# Patient Record
Sex: FEMALE | Race: BLACK OR AFRICAN AMERICAN | NOT HISPANIC OR LATINO | URBAN - METROPOLITAN AREA
[De-identification: names, ages, dates, MRNs, and addresses within clinical notes are randomized per-mention and may not be internally consistent; named-entity substitution may affect disease eponyms.]

---

## 2018-11-09 ENCOUNTER — EMERGENCY (EMERGENCY)
Facility: HOSPITAL | Age: 53
LOS: 1 days | Discharge: HOME | End: 2018-11-09
Attending: EMERGENCY MEDICINE
Payer: COMMERCIAL

## 2018-11-09 VITALS
HEIGHT: 68 IN | WEIGHT: 160.06 LBS | TEMPERATURE: 98 F | SYSTOLIC BLOOD PRESSURE: 159 MMHG | DIASTOLIC BLOOD PRESSURE: 100 MMHG | OXYGEN SATURATION: 96 % | HEART RATE: 82 BPM | RESPIRATION RATE: 18 BRPM

## 2018-11-09 VITALS
SYSTOLIC BLOOD PRESSURE: 139 MMHG | RESPIRATION RATE: 16 BRPM | OXYGEN SATURATION: 100 % | DIASTOLIC BLOOD PRESSURE: 88 MMHG | HEART RATE: 74 BPM | TEMPERATURE: 98 F

## 2018-11-09 PROCEDURE — 82962 GLUCOSE BLOOD TEST: CPT

## 2018-11-09 PROCEDURE — 99284 EMERGENCY DEPT VISIT MOD MDM: CPT

## 2018-11-09 PROCEDURE — 99283 EMERGENCY DEPT VISIT LOW MDM: CPT

## 2018-11-09 NOTE — ED PROVIDER NOTE - MEDICAL DECISION MAKING DETAILS
Pt with hx of seizures, s/p discussed with Dr. Rivera will discharge. Pt having scheduled MRI for this week. Pt with hx of seizures, s/p discussed with Dr. Mcbride will discharge. Pt having scheduled MRI for NEXT week.

## 2018-11-09 NOTE — ED PROVIDER NOTE - OBJECTIVE STATEMENT
52 y/o F pt with hx of seizures, presents to ED s/p seizure episode c/o HA and dizziness. Pt felt dizzy while at work, and was told she had seized after LOC. Pt now in ED states she feels better. Pt is compliant with her seizure meds, pt has not missed any doses. Pt called her neurologist after episode and has a scheduled MRI this Monday. She admits it is normal for her to have some decreased sensation to the R side of her face. Denies head trauma. No further complaints at this time. 52 y/o F pt with hx of seizures, presents to ED s/p seizure episode c/o HA and dizziness. Pt felt dizzy while at work, and was told she had seized after LOC. Pt now in ED states she feels better. Pt is compliant with her seizure meds, pt has not missed any doses. Pt called her neurologist after episode and has a scheduled MRI this Monday. She admits it is normal for her to have some decreased sensation to the R side of her face. Denies head trauma. No further complaints at this time.  Neuro: Nicole

## 2018-11-09 NOTE — ED PROVIDER NOTE - NEUROLOGICAL, MLM
Alert and oriented, no focal deficits, no motor or sensory deficits. Neurovascularly intact. Normal leg raise, No pronator drift.

## 2018-11-09 NOTE — ED ADULT TRIAGE NOTE - CHIEF COMPLAINT QUOTE
Patient BIBA, witnessed seizure lasting about 1 min, hx of seizures and on seizure medication, patient awake, drowsy at this time which is normal for patient after seizure

## 2018-11-09 NOTE — ED ADULT NURSE NOTE - OBJECTIVE STATEMENT
Patient is alert and verbal. c/o dizziness, lightheadedness, drowsy. had a seizure unknown duration.

## 2019-08-06 ENCOUNTER — APPOINTMENT (OUTPATIENT)
Dept: OBGYN | Facility: CLINIC | Age: 54
End: 2019-08-06
Payer: COMMERCIAL

## 2019-08-06 VITALS
SYSTOLIC BLOOD PRESSURE: 130 MMHG | WEIGHT: 177 LBS | HEIGHT: 67 IN | HEART RATE: 72 BPM | BODY MASS INDEX: 27.78 KG/M2 | DIASTOLIC BLOOD PRESSURE: 85 MMHG

## 2019-08-06 DIAGNOSIS — Z82.49 FAMILY HISTORY OF ISCHEMIC HEART DISEASE AND OTHER DISEASES OF THE CIRCULATORY SYSTEM: ICD-10-CM

## 2019-08-06 DIAGNOSIS — Z78.9 OTHER SPECIFIED HEALTH STATUS: ICD-10-CM

## 2019-08-06 DIAGNOSIS — R32 UNSPECIFIED URINARY INCONTINENCE: ICD-10-CM

## 2019-08-06 DIAGNOSIS — R35.0 FREQUENCY OF MICTURITION: ICD-10-CM

## 2019-08-06 DIAGNOSIS — Z80.9 FAMILY HISTORY OF MALIGNANT NEOPLASM, UNSPECIFIED: ICD-10-CM

## 2019-08-06 DIAGNOSIS — Z87.898 PERSONAL HISTORY OF OTHER SPECIFIED CONDITIONS: ICD-10-CM

## 2019-08-06 PROCEDURE — 99204 OFFICE O/P NEW MOD 45 MIN: CPT

## 2019-08-07 LAB
C TRACH RRNA SPEC QL NAA+PROBE: NOT DETECTED
HPV HIGH+LOW RISK DNA PNL CVX: NOT DETECTED
N GONORRHOEA RRNA SPEC QL NAA+PROBE: NOT DETECTED
SOURCE TP AMPLIFICATION: NORMAL

## 2019-08-12 LAB — CYTOLOGY CVX/VAG DOC THIN PREP: NORMAL

## 2019-09-19 ENCOUNTER — APPOINTMENT (OUTPATIENT)
Dept: ENDOCRINOLOGY | Facility: CLINIC | Age: 54
End: 2019-09-19
Payer: COMMERCIAL

## 2019-09-19 VITALS
DIASTOLIC BLOOD PRESSURE: 70 MMHG | HEIGHT: 67 IN | WEIGHT: 179 LBS | SYSTOLIC BLOOD PRESSURE: 122 MMHG | HEART RATE: 68 BPM | BODY MASS INDEX: 28.09 KG/M2

## 2019-09-19 DIAGNOSIS — Z78.9 OTHER SPECIFIED HEALTH STATUS: ICD-10-CM

## 2019-09-19 DIAGNOSIS — E66.3 OVERWEIGHT: ICD-10-CM

## 2019-09-19 DIAGNOSIS — E16.2 HYPOGLYCEMIA, UNSPECIFIED: ICD-10-CM

## 2019-09-19 DIAGNOSIS — E87.6 HYPOKALEMIA: ICD-10-CM

## 2019-09-19 DIAGNOSIS — Z83.49 FAMILY HISTORY OF OTHER ENDOCRINE, NUTRITIONAL AND METABOLIC DISEASES: ICD-10-CM

## 2019-09-19 DIAGNOSIS — Z86.69 PERSONAL HISTORY OF OTHER DISEASES OF THE NERVOUS SYSTEM AND SENSE ORGANS: ICD-10-CM

## 2019-09-19 DIAGNOSIS — Z83.3 FAMILY HISTORY OF DIABETES MELLITUS: ICD-10-CM

## 2019-09-19 PROCEDURE — 99244 OFF/OP CNSLTJ NEW/EST MOD 40: CPT

## 2019-09-19 RX ORDER — LAMOTRIGINE 100 MG/1
100 TABLET ORAL
Refills: 0 | Status: ACTIVE | COMMUNITY

## 2019-09-19 RX ORDER — GABAPENTIN 300 MG/1
300 CAPSULE ORAL
Refills: 0 | Status: ACTIVE | COMMUNITY

## 2019-09-19 NOTE — HISTORY OF PRESENT ILLNESS
[FreeTextEntry1] : sent from neurology from \[facility]""[facility] and \[facility]""G \arpita she has migraines

## 2019-09-19 NOTE — HISTORY OF PRESENT ILLNESS
[FreeTextEntry1] : [facility] she has [facility]raines

## 2019-09-19 NOTE — ASSESSMENT
[FreeTextEntry1] : HypoG : she has absence seizures and when seen in the hospital for it she was found to have hypoG on blood work and she was treated with glucose iv. She was also found to have hypoK. The only meds she takes are lamictal  and gabapentin. Other episodes of hypoG had she was asymptomatic. Mother with dm2 lives with her. \par check cbc, cmp, insulin, sulfonylurea screen, tfts, aldosterone, renin, acth/cortisol, beta hydroxybutirate proinsulin \par check SHON and ICA \par check A1c to rule out diabetes onset which can include hypog \par will provide glucometer and start checking bg when feeling " funny" .  noticed that she " does not participate" but she has seizures. \par \par HypoK :  check aldosterone and renin. Check TFts. \par \par Overweight;\par discussed diet and exercise\par encouraged more exercise walking 30 min 3 x week\par Needs to try to have more protein for meals\par

## 2019-09-21 ENCOUNTER — LABORATORY RESULT (OUTPATIENT)
Age: 54
End: 2019-09-21

## 2019-10-02 ENCOUNTER — RESULT REVIEW (OUTPATIENT)
Age: 54
End: 2019-10-02

## 2019-10-02 LAB
ACTH SER-ACNC: 26.4 PG/ML
ALBUMIN SERPL ELPH-MCNC: 4.3 G/DL
ALDOSTERONE SERUM: 7.1 NG/DL
ALP BLD-CCNC: 47 U/L
ALT SERPL-CCNC: 17 U/L
ANION GAP SERPL CALC-SCNC: 11 MMOL/L
AST SERPL-CCNC: 20 U/L
B-OH-BUTYR SERPL-SCNC: 0.1 MMOL/L
BASOPHILS # BLD AUTO: 0.03 K/UL
BASOPHILS NFR BLD AUTO: 0.8 %
BILIRUB SERPL-MCNC: 0.6 MG/DL
BUN SERPL-MCNC: 7 MG/DL
CALCIUM SERPL-MCNC: 9.5 MG/DL
CHLORIDE SERPL-SCNC: 101 MMOL/L
CO2 SERPL-SCNC: 27 MMOL/L
CORTIS SERPL-MCNC: 10.6 UG/DL
CREAT SERPL-MCNC: 1.03 MG/DL
EOSINOPHIL # BLD AUTO: 0.08 K/UL
EOSINOPHIL NFR BLD AUTO: 2.2 %
ESTIMATED AVERAGE GLUCOSE: 111 MG/DL
GLUCOSE SERPL-MCNC: 99 MG/DL
HBA1C MFR BLD HPLC: 5.5 %
HCT VFR BLD CALC: 39.8 %
HGB BLD-MCNC: 13.3 G/DL
IMM GRANULOCYTES NFR BLD AUTO: 0 %
INSULIN SERPL-MCNC: 8.1 UU/ML
LYMPHOCYTES # BLD AUTO: 1.43 K/UL
LYMPHOCYTES NFR BLD AUTO: 39.1 %
MAN DIFF?: NORMAL
MCHC RBC-ENTMCNC: 27.9 PG
MCHC RBC-ENTMCNC: 33.4 GM/DL
MCV RBC AUTO: 83.4 FL
MONOCYTES # BLD AUTO: 0.32 K/UL
MONOCYTES NFR BLD AUTO: 8.7 %
NEUTROPHILS # BLD AUTO: 1.8 K/UL
NEUTROPHILS NFR BLD AUTO: 49.2 %
PLATELET # BLD AUTO: 344 K/UL
POTASSIUM SERPL-SCNC: 4.4 MMOL/L
PROT SERPL-MCNC: 6.5 G/DL
RBC # BLD: 4.77 M/UL
RBC # FLD: 12.9 %
RENIN ACTIVITY, PLASMA: 0.38 NG/ML/HR
SODIUM SERPL-SCNC: 139 MMOL/L
T3 SERPL-MCNC: 84 NG/DL
T4 FREE SERPL-MCNC: 1 NG/DL
TSH SERPL-ACNC: 1.96 UIU/ML
WBC # FLD AUTO: 3.66 K/UL

## 2019-10-09 LAB — PROINSULIN SERPL-MCNC: 2 PMOL/L

## 2020-11-24 ENCOUNTER — APPOINTMENT (OUTPATIENT)
Dept: OBGYN | Facility: CLINIC | Age: 55
End: 2020-11-24

## 2020-12-09 ENCOUNTER — TRANSCRIPTION ENCOUNTER (OUTPATIENT)
Age: 55
End: 2020-12-09

## 2021-01-29 ENCOUNTER — TRANSCRIPTION ENCOUNTER (OUTPATIENT)
Age: 56
End: 2021-01-29

## 2021-03-03 ENCOUNTER — APPOINTMENT (OUTPATIENT)
Dept: OBGYN | Facility: CLINIC | Age: 56
End: 2021-03-03

## 2021-04-26 ENCOUNTER — APPOINTMENT (OUTPATIENT)
Dept: OBGYN | Facility: CLINIC | Age: 56
End: 2021-04-26

## 2021-04-28 ENCOUNTER — APPOINTMENT (OUTPATIENT)
Dept: OBGYN | Facility: CLINIC | Age: 56
End: 2021-04-28
Payer: COMMERCIAL

## 2021-04-28 VITALS
SYSTOLIC BLOOD PRESSURE: 148 MMHG | HEIGHT: 67 IN | DIASTOLIC BLOOD PRESSURE: 90 MMHG | HEART RATE: 76 BPM | WEIGHT: 183.38 LBS | BODY MASS INDEX: 28.78 KG/M2

## 2021-04-28 DIAGNOSIS — Z12.39 ENCOUNTER FOR OTHER SCREENING FOR MALIGNANT NEOPLASM OF BREAST: ICD-10-CM

## 2021-04-28 PROCEDURE — 99072 ADDL SUPL MATRL&STAF TM PHE: CPT

## 2021-04-28 PROCEDURE — 99396 PREV VISIT EST AGE 40-64: CPT

## 2021-04-28 NOTE — PHYSICAL EXAM
[Alert] : alert [Appropriately responsive] : appropriately responsive [No Acute Distress] : no acute distress [No Lymphadenopathy] : no lymphadenopathy [Regular Rate Rhythm] : regular rate rhythm [No Murmurs] : no murmurs [Clear to Auscultation B/L] : clear to auscultation bilaterally [Soft] : soft [Non-tender] : non-tender [Non-distended] : non-distended [No HSM] : No HSM [No Lesions] : no lesions [No Mass] : no mass [Oriented x3] : oriented x3 [Examination Of The Breasts] : a normal appearance [No Masses] : no breast masses were palpable [Vulvar Atrophy] : vulvar atrophy [Labia Minora] : normal [Labia Majora] : normal [Normal] : normal [Atrophy] : atrophy [Dry Mucosa] : dry mucosa [Absent] : absent [Uterine Adnexae] : normal

## 2021-05-01 ENCOUNTER — EMERGENCY (EMERGENCY)
Facility: HOSPITAL | Age: 56
LOS: 1 days | Discharge: HOME | End: 2021-05-01
Attending: EMERGENCY MEDICINE
Payer: COMMERCIAL

## 2021-05-01 VITALS
WEIGHT: 186.07 LBS | TEMPERATURE: 98 F | SYSTOLIC BLOOD PRESSURE: 163 MMHG | HEIGHT: 68 IN | DIASTOLIC BLOOD PRESSURE: 102 MMHG | OXYGEN SATURATION: 98 % | HEART RATE: 78 BPM | RESPIRATION RATE: 18 BRPM

## 2021-05-01 PROCEDURE — 70450 CT HEAD/BRAIN W/O DYE: CPT

## 2021-05-01 PROCEDURE — 99284 EMERGENCY DEPT VISIT MOD MDM: CPT | Mod: 25

## 2021-05-01 PROCEDURE — 70450 CT HEAD/BRAIN W/O DYE: CPT | Mod: 26,MA

## 2021-05-01 PROCEDURE — 99284 EMERGENCY DEPT VISIT MOD MDM: CPT

## 2021-05-01 RX ORDER — MECLIZINE HCL 12.5 MG
50 TABLET ORAL ONCE
Refills: 0 | Status: COMPLETED | OUTPATIENT
Start: 2021-05-01 | End: 2021-05-01

## 2021-05-01 RX ORDER — MECLIZINE HCL 12.5 MG
1 TABLET ORAL
Qty: 28 | Refills: 0
Start: 2021-05-01 | End: 2021-05-07

## 2021-05-01 RX ADMIN — Medication 50 MILLIGRAM(S): at 07:44

## 2021-05-01 NOTE — ED PROVIDER NOTE - PATIENT PORTAL LINK FT
You can access the FollowMyHealth Patient Portal offered by [facility] by registering at the following website: [email or web address]. By joining [employer]’s FollowMyHealth portal, you will also be able to view your health information using other applications (apps) compatible with our system.

## 2021-05-01 NOTE — ED ADULT TRIAGE NOTE - CHIEF COMPLAINT QUOTE
PT coming to ED C/O of dizziness for the last 2 weeks.  Dizziness occurs when changing positions.  +nausea.  PT was diagnosed with tinnitus 2 weeks ago assoicated with dizziness. Was placed on steroids and was supposed to receive a MRI this morning.  Denies weakness or visual disturbances.

## 2021-05-01 NOTE — ED PROVIDER NOTE - OBJECTIVE STATEMENT
55 yo female pmh seizure  2 month history of tinnitus, dizziness; pt comes to ed with continued symptoms; pt did not take any 55 yo female pmh seizure  2 month history of tinnitus, dizziness; pt comes to ed with continued symptoms; pt did not take any meds; pt scheduled for mri of brain today

## 2021-05-01 NOTE — ED PROVIDER NOTE - PHYSICAL EXAMINATION
Alert, lucid, and in no apparent distress. Pt is normocephalic, atraumatic.  Pupils are equal, round, no dysmetria.  rt ear normal tm;  lips pink, moist mucous membranes, tongue midline. Neck supple.   Lungs clear to auscultation. Heart regular rate and rhythm, normal S1, S2, no murmurs, gallops, rubs.  Abdomen is soft, nontender, no pulsatile mass, no masses, no distension, no rebound. No CVA Tenderness, no suprapubic tenderness.   Non-focal sensory, 5 out of 5 motor strength, no dysmetria, fluent, goal directed speech. CN2 to 12 intact. Skin without rash  No submandibular adenopathy. Normal mentation, does not appear agitated

## 2021-05-11 LAB — CYTOLOGY CVX/VAG DOC THIN PREP: NORMAL

## 2021-07-22 ENCOUNTER — TRANSCRIPTION ENCOUNTER (OUTPATIENT)
Age: 56
End: 2021-07-22

## 2021-10-08 ENCOUNTER — TRANSCRIPTION ENCOUNTER (OUTPATIENT)
Age: 56
End: 2021-10-08

## 2021-11-18 ENCOUNTER — TRANSCRIPTION ENCOUNTER (OUTPATIENT)
Age: 56
End: 2021-11-18

## 2022-04-22 ENCOUNTER — TRANSCRIPTION ENCOUNTER (OUTPATIENT)
Age: 57
End: 2022-04-22

## 2022-09-21 ENCOUNTER — NON-APPOINTMENT (OUTPATIENT)
Age: 57
End: 2022-09-21

## 2022-10-04 ENCOUNTER — APPOINTMENT (OUTPATIENT)
Dept: OBGYN | Facility: CLINIC | Age: 57
End: 2022-10-04

## 2022-10-04 VITALS
WEIGHT: 171.3 LBS | HEIGHT: 67 IN | SYSTOLIC BLOOD PRESSURE: 124 MMHG | BODY MASS INDEX: 26.89 KG/M2 | DIASTOLIC BLOOD PRESSURE: 77 MMHG

## 2022-10-04 PROCEDURE — 99396 PREV VISIT EST AGE 40-64: CPT

## 2022-10-04 PROCEDURE — 56405 I&D VULVA/PERINEAL ABSCESS: CPT

## 2022-10-04 RX ORDER — FLUCONAZOLE 200 MG/1
200 TABLET ORAL
Qty: 1 | Refills: 8 | Status: ACTIVE | COMMUNITY
Start: 2022-10-04 | End: 1900-01-01

## 2022-10-04 RX ORDER — AMOXICILLIN 500 MG/1
500 CAPSULE ORAL 3 TIMES DAILY
Qty: 21 | Refills: 3 | Status: ACTIVE | COMMUNITY
Start: 2022-10-04 | End: 1900-01-01

## 2022-10-04 NOTE — HISTORY OF PRESENT ILLNESS
[Y] : Positive pregnancy history [Menarche Age: ____] : age at menarche was [unfilled] [Currently In Menopause] : currently in menopause [PGHxTotal] : 2 [[facility]xFullTerm] : 2 [PGHxPremature] : 0 [PGHxAbortions] : 0 [[facility]xLiving] : 2 [PGHxABInduced] : 0 [PGHxABSpont] : 0 [PGHxEctopic] : 0 [PGHxMultBirths] : 0

## 2022-10-04 NOTE — PHYSICAL EXAM
[Chaperone Present] : A chaperone was present in the examining room during all aspects of the physical examination [FreeTextEntry1] : Rena [Appropriately responsive] : appropriately responsive [Alert] : alert [No Acute Distress] : no acute distress [No Lymphadenopathy] : no lymphadenopathy [Regular Rate Rhythm] : regular rate rhythm [No Murmurs] : no murmurs [Clear to Auscultation B/L] : clear to auscultation bilaterally [Soft] : soft [Non-tender] : non-tender [Non-distended] : non-distended [No HSM] : No HSM [No Lesions] : no lesions [No Mass] : no mass [Oriented x3] : oriented x3 [FreeTextEntry7] : Right-sided abscess [Examination Of The Breasts] : a normal appearance [No Masses] : no breast masses were palpable [Vulvar Atrophy] : vulvar atrophy [Labia Majora] : normal [Labia Minora] : normal [Normal] : normal [Atrophy] : atrophy [Dry Mucosa] : dry mucosa [Absent] : absent [Uterine Adnexae] : normal

## 2022-10-08 LAB — CYTOLOGY CVX/VAG DOC THIN PREP: ABNORMAL

## 2022-10-25 DIAGNOSIS — N64.89 OTHER SPECIFIED DISORDERS OF BREAST: ICD-10-CM

## 2022-11-11 ENCOUNTER — APPOINTMENT (OUTPATIENT)
Dept: OBGYN | Facility: CLINIC | Age: 57
End: 2022-11-11

## 2022-11-11 VITALS
DIASTOLIC BLOOD PRESSURE: 93 MMHG | HEIGHT: 67 IN | SYSTOLIC BLOOD PRESSURE: 147 MMHG | BODY MASS INDEX: 27.19 KG/M2 | WEIGHT: 173.25 LBS

## 2022-11-11 DIAGNOSIS — L02.211 CUTANEOUS ABSCESS OF ABDOMINAL WALL: ICD-10-CM

## 2022-11-11 DIAGNOSIS — N95.1 MENOPAUSAL AND FEMALE CLIMACTERIC STATES: ICD-10-CM

## 2022-11-11 PROCEDURE — 99214 OFFICE O/P EST MOD 30 MIN: CPT

## 2023-07-29 ENCOUNTER — OFFICE (OUTPATIENT)
Dept: URBAN - METROPOLITAN AREA CLINIC 12 | Facility: CLINIC | Age: 58
Setting detail: OPHTHALMOLOGY
End: 2023-07-29
Payer: COMMERCIAL

## 2023-07-29 ENCOUNTER — RX ONLY (RX ONLY)
Age: 58
End: 2023-07-29

## 2023-07-29 DIAGNOSIS — H43.393: ICD-10-CM

## 2023-07-29 DIAGNOSIS — H01.005: ICD-10-CM

## 2023-07-29 DIAGNOSIS — H16.223: ICD-10-CM

## 2023-07-29 DIAGNOSIS — H35.413: ICD-10-CM

## 2023-07-29 DIAGNOSIS — H01.004: ICD-10-CM

## 2023-07-29 DIAGNOSIS — H25.13: ICD-10-CM

## 2023-07-29 DIAGNOSIS — H01.002: ICD-10-CM

## 2023-07-29 DIAGNOSIS — H52.13: ICD-10-CM

## 2023-07-29 DIAGNOSIS — H01.001: ICD-10-CM

## 2023-07-29 PROBLEM — H52.7 REFRACTIVE ERROR: Status: ACTIVE | Noted: 2023-07-29

## 2023-07-29 PROCEDURE — 92014 COMPRE OPH EXAM EST PT 1/>: CPT | Performed by: STUDENT IN AN ORGANIZED HEALTH CARE EDUCATION/TRAINING PROGRAM

## 2023-07-29 PROCEDURE — 92250 FUNDUS PHOTOGRAPHY W/I&R: CPT | Performed by: STUDENT IN AN ORGANIZED HEALTH CARE EDUCATION/TRAINING PROGRAM

## 2023-07-29 PROCEDURE — 92015 DETERMINE REFRACTIVE STATE: CPT | Performed by: STUDENT IN AN ORGANIZED HEALTH CARE EDUCATION/TRAINING PROGRAM

## 2023-07-29 ASSESSMENT — REFRACTION_CURRENTRX
OS_SPHERE: -1.00
OD_CYLINDER: -1.75
OD_AXIS: 090
OS_AXIS: 075
OS_CYLINDER: -0.75
OD_VPRISM_DIRECTION: SV
OD_SPHERE: -2.50
OS_VPRISM_DIRECTION: SV
OD_OVR_VA: 20/
OS_OVR_VA: 20/

## 2023-07-29 ASSESSMENT — KERATOMETRY
OS_K2POWER_DIOPTERS: 43.00
OD_K1POWER_DIOPTERS: 42.75
OD_K2POWER_DIOPTERS: 43.75
OS_AXISANGLE_DEGREES: 122
METHOD_AUTO_MANUAL: AUTO
OS_K1POWER_DIOPTERS: 42.50
OD_AXISANGLE_DEGREES: 016

## 2023-07-29 ASSESSMENT — CONFRONTATIONAL VISUAL FIELD TEST (CVF)
OD_FINDINGS: FULL
OS_FINDINGS: FULL

## 2023-07-29 ASSESSMENT — TONOMETRY
OS_IOP_MMHG: 10
OD_IOP_MMHG: 12

## 2023-07-29 ASSESSMENT — SPHEQUIV_DERIVED
OD_SPHEQUIV: -3.5
OS_SPHEQUIV: -1.25
OD_SPHEQUIV: -3.25
OS_SPHEQUIV: -1

## 2023-07-29 ASSESSMENT — LID EXAM ASSESSMENTS
OD_BLEPHARITIS: 2+
OS_BLEPHARITIS: 2+

## 2023-07-29 ASSESSMENT — REFRACTION_AUTOREFRACTION
OS_AXIS: 072
OS_CYLINDER: -1.00
OD_CYLINDER: -2.00
OD_AXIS: 081
OD_SPHERE: -2.25
OS_SPHERE: -0.50

## 2023-07-29 ASSESSMENT — REFRACTION_MANIFEST
OS_AXIS: 075
OD_SPHERE: -2.50
OD_CYLINDER: -2.00
OD_VA1: 20/20-1
OD_AXIS: 080
OS_CYLINDER: -1.00
OS_VA1: 20/25+2
OS_SPHERE: -0.75

## 2023-07-29 ASSESSMENT — AXIALLENGTH_DERIVED
OS_AL: 24.3799
OD_AL: 25.0329
OS_AL: 24.2763
OD_AL: 25.143

## 2023-07-29 ASSESSMENT — VISUAL ACUITY
OS_BCVA: 20/25-2
OD_BCVA: 20/25-2

## 2023-07-29 ASSESSMENT — SUPERFICIAL PUNCTATE KERATITIS (SPK)
OD_SPK: 1+
OS_SPK: 1+

## 2023-11-11 ENCOUNTER — OFFICE (OUTPATIENT)
Dept: URBAN - METROPOLITAN AREA CLINIC 12 | Facility: CLINIC | Age: 58
Setting detail: OPHTHALMOLOGY
End: 2023-11-11
Payer: COMMERCIAL

## 2023-11-11 DIAGNOSIS — H35.413: ICD-10-CM

## 2023-11-11 DIAGNOSIS — H16.223: ICD-10-CM

## 2023-11-11 DIAGNOSIS — H43.393: ICD-10-CM

## 2023-11-11 DIAGNOSIS — H25.13: ICD-10-CM

## 2023-11-11 PROCEDURE — 92014 COMPRE OPH EXAM EST PT 1/>: CPT | Performed by: OPHTHALMOLOGY

## 2023-11-11 ASSESSMENT — REFRACTION_CURRENTRX
OS_AXIS: 075
OD_AXIS: 090
OD_VPRISM_DIRECTION: SV
OS_VPRISM_DIRECTION: SV
OS_CYLINDER: -0.75
OS_SPHERE: -1.00
OD_CYLINDER: -1.75
OS_OVR_VA: 20/
OD_OVR_VA: 20/
OD_SPHERE: -2.50

## 2023-11-11 ASSESSMENT — SPHEQUIV_DERIVED
OD_SPHEQUIV: -3.5
OS_SPHEQUIV: -1.25
OD_SPHEQUIV: -3.25
OS_SPHEQUIV: -1.25

## 2023-11-11 ASSESSMENT — LID EXAM ASSESSMENTS
OD_BLEPHARITIS: 2+
OS_BLEPHARITIS: 2+

## 2023-11-11 ASSESSMENT — REFRACTION_MANIFEST
OD_AXIS: 83
OS_VA1: 20/20-
OS_SPHERE: -0.75
OD_VA1: 20/20-1
OD_CYLINDER: -2.00
OD_SPHERE: -2.50
OS_CYLINDER: -1.00
OS_AXIS: 76

## 2023-11-11 ASSESSMENT — REFRACTION_AUTOREFRACTION
OS_SPHERE: -0.75
OD_CYLINDER: -2.00
OS_AXIS: 072
OS_CYLINDER: -1.00
OD_SPHERE: -2.25
OD_AXIS: 085

## 2023-11-11 ASSESSMENT — SUPERFICIAL PUNCTATE KERATITIS (SPK)
OD_SPK: 1+
OS_SPK: 1+

## 2023-11-11 ASSESSMENT — CONFRONTATIONAL VISUAL FIELD TEST (CVF)
OD_FINDINGS: FULL
OS_FINDINGS: FULL

## 2023-11-13 ENCOUNTER — EMERGENCY (EMERGENCY)
Facility: HOSPITAL | Age: 58
LOS: 1 days | Discharge: HOME | End: 2023-11-13
Attending: EMERGENCY MEDICINE
Payer: COMMERCIAL

## 2023-11-13 VITALS
RESPIRATION RATE: 20 BRPM | TEMPERATURE: 97 F | DIASTOLIC BLOOD PRESSURE: 87 MMHG | HEART RATE: 62 BPM | SYSTOLIC BLOOD PRESSURE: 121 MMHG

## 2023-11-13 VITALS
RESPIRATION RATE: 16 BRPM | DIASTOLIC BLOOD PRESSURE: 100 MMHG | HEART RATE: 105 BPM | SYSTOLIC BLOOD PRESSURE: 180 MMHG | OXYGEN SATURATION: 98 %

## 2023-11-13 LAB
ALBUMIN SERPL ELPH-MCNC: 3.6 G/DL — SIGNIFICANT CHANGE UP (ref 3.3–5.2)
ALBUMIN SERPL ELPH-MCNC: 3.6 G/DL — SIGNIFICANT CHANGE UP (ref 3.3–5.2)
ALP SERPL-CCNC: 58 U/L — SIGNIFICANT CHANGE UP (ref 40–120)
ALP SERPL-CCNC: 58 U/L — SIGNIFICANT CHANGE UP (ref 40–120)
ALT FLD-CCNC: 30 U/L — SIGNIFICANT CHANGE UP
ALT FLD-CCNC: 30 U/L — SIGNIFICANT CHANGE UP
ANION GAP SERPL CALC-SCNC: 9 MMOL/L — SIGNIFICANT CHANGE UP (ref 5–17)
ANION GAP SERPL CALC-SCNC: 9 MMOL/L — SIGNIFICANT CHANGE UP (ref 5–17)
APTT BLD: 28.5 SEC — SIGNIFICANT CHANGE UP (ref 24.5–35.6)
APTT BLD: 28.5 SEC — SIGNIFICANT CHANGE UP (ref 24.5–35.6)
AST SERPL-CCNC: 24 U/L — SIGNIFICANT CHANGE UP
AST SERPL-CCNC: 24 U/L — SIGNIFICANT CHANGE UP
BASOPHILS # BLD AUTO: 0.03 K/UL — SIGNIFICANT CHANGE UP (ref 0–0.2)
BASOPHILS # BLD AUTO: 0.03 K/UL — SIGNIFICANT CHANGE UP (ref 0–0.2)
BASOPHILS NFR BLD AUTO: 0.7 % — SIGNIFICANT CHANGE UP (ref 0–2)
BASOPHILS NFR BLD AUTO: 0.7 % — SIGNIFICANT CHANGE UP (ref 0–2)
BILIRUB SERPL-MCNC: 0.4 MG/DL — SIGNIFICANT CHANGE UP (ref 0.4–2)
BILIRUB SERPL-MCNC: 0.4 MG/DL — SIGNIFICANT CHANGE UP (ref 0.4–2)
BUN SERPL-MCNC: 9.9 MG/DL — SIGNIFICANT CHANGE UP (ref 8–20)
BUN SERPL-MCNC: 9.9 MG/DL — SIGNIFICANT CHANGE UP (ref 8–20)
CALCIUM SERPL-MCNC: 8.4 MG/DL — SIGNIFICANT CHANGE UP (ref 8.4–10.5)
CALCIUM SERPL-MCNC: 8.4 MG/DL — SIGNIFICANT CHANGE UP (ref 8.4–10.5)
CHLORIDE SERPL-SCNC: 97 MMOL/L — SIGNIFICANT CHANGE UP (ref 96–108)
CHLORIDE SERPL-SCNC: 97 MMOL/L — SIGNIFICANT CHANGE UP (ref 96–108)
CK SERPL-CCNC: 96 U/L — SIGNIFICANT CHANGE UP (ref 25–170)
CK SERPL-CCNC: 96 U/L — SIGNIFICANT CHANGE UP (ref 25–170)
CO2 SERPL-SCNC: 29 MMOL/L — SIGNIFICANT CHANGE UP (ref 22–29)
CO2 SERPL-SCNC: 29 MMOL/L — SIGNIFICANT CHANGE UP (ref 22–29)
CREAT SERPL-MCNC: 0.87 MG/DL — SIGNIFICANT CHANGE UP (ref 0.5–1.3)
CREAT SERPL-MCNC: 0.87 MG/DL — SIGNIFICANT CHANGE UP (ref 0.5–1.3)
EGFR: 77 ML/MIN/1.73M2 — SIGNIFICANT CHANGE UP
EGFR: 77 ML/MIN/1.73M2 — SIGNIFICANT CHANGE UP
EOSINOPHIL # BLD AUTO: 0.01 K/UL — SIGNIFICANT CHANGE UP (ref 0–0.5)
EOSINOPHIL # BLD AUTO: 0.01 K/UL — SIGNIFICANT CHANGE UP (ref 0–0.5)
EOSINOPHIL NFR BLD AUTO: 0.2 % — SIGNIFICANT CHANGE UP (ref 0–6)
EOSINOPHIL NFR BLD AUTO: 0.2 % — SIGNIFICANT CHANGE UP (ref 0–6)
GLUCOSE SERPL-MCNC: 167 MG/DL — HIGH (ref 70–99)
GLUCOSE SERPL-MCNC: 167 MG/DL — HIGH (ref 70–99)
HCT VFR BLD CALC: 33.6 % — LOW (ref 34.5–45)
HCT VFR BLD CALC: 33.6 % — LOW (ref 34.5–45)
HGB BLD-MCNC: 11.5 G/DL — SIGNIFICANT CHANGE UP (ref 11.5–15.5)
HGB BLD-MCNC: 11.5 G/DL — SIGNIFICANT CHANGE UP (ref 11.5–15.5)
IMM GRANULOCYTES NFR BLD AUTO: 0.2 % — SIGNIFICANT CHANGE UP (ref 0–0.9)
IMM GRANULOCYTES NFR BLD AUTO: 0.2 % — SIGNIFICANT CHANGE UP (ref 0–0.9)
INR BLD: 1.03 RATIO — SIGNIFICANT CHANGE UP (ref 0.85–1.18)
INR BLD: 1.03 RATIO — SIGNIFICANT CHANGE UP (ref 0.85–1.18)
LYMPHOCYTES # BLD AUTO: 1.68 K/UL — SIGNIFICANT CHANGE UP (ref 1–3.3)
LYMPHOCYTES # BLD AUTO: 1.68 K/UL — SIGNIFICANT CHANGE UP (ref 1–3.3)
LYMPHOCYTES # BLD AUTO: 38.1 % — SIGNIFICANT CHANGE UP (ref 13–44)
LYMPHOCYTES # BLD AUTO: 38.1 % — SIGNIFICANT CHANGE UP (ref 13–44)
MCHC RBC-ENTMCNC: 27.3 PG — SIGNIFICANT CHANGE UP (ref 27–34)
MCHC RBC-ENTMCNC: 27.3 PG — SIGNIFICANT CHANGE UP (ref 27–34)
MCHC RBC-ENTMCNC: 34.2 GM/DL — SIGNIFICANT CHANGE UP (ref 32–36)
MCHC RBC-ENTMCNC: 34.2 GM/DL — SIGNIFICANT CHANGE UP (ref 32–36)
MCV RBC AUTO: 79.6 FL — LOW (ref 80–100)
MCV RBC AUTO: 79.6 FL — LOW (ref 80–100)
MONOCYTES # BLD AUTO: 0.39 K/UL — SIGNIFICANT CHANGE UP (ref 0–0.9)
MONOCYTES # BLD AUTO: 0.39 K/UL — SIGNIFICANT CHANGE UP (ref 0–0.9)
MONOCYTES NFR BLD AUTO: 8.8 % — SIGNIFICANT CHANGE UP (ref 2–14)
MONOCYTES NFR BLD AUTO: 8.8 % — SIGNIFICANT CHANGE UP (ref 2–14)
NEUTROPHILS # BLD AUTO: 2.29 K/UL — SIGNIFICANT CHANGE UP (ref 1.8–7.4)
NEUTROPHILS # BLD AUTO: 2.29 K/UL — SIGNIFICANT CHANGE UP (ref 1.8–7.4)
NEUTROPHILS NFR BLD AUTO: 52 % — SIGNIFICANT CHANGE UP (ref 43–77)
NEUTROPHILS NFR BLD AUTO: 52 % — SIGNIFICANT CHANGE UP (ref 43–77)
PLATELET # BLD AUTO: 263 K/UL — SIGNIFICANT CHANGE UP (ref 150–400)
PLATELET # BLD AUTO: 263 K/UL — SIGNIFICANT CHANGE UP (ref 150–400)
POTASSIUM SERPL-MCNC: 3.5 MMOL/L — SIGNIFICANT CHANGE UP (ref 3.5–5.3)
POTASSIUM SERPL-MCNC: 3.5 MMOL/L — SIGNIFICANT CHANGE UP (ref 3.5–5.3)
POTASSIUM SERPL-SCNC: 3.5 MMOL/L — SIGNIFICANT CHANGE UP (ref 3.5–5.3)
POTASSIUM SERPL-SCNC: 3.5 MMOL/L — SIGNIFICANT CHANGE UP (ref 3.5–5.3)
PROT SERPL-MCNC: 5.9 G/DL — LOW (ref 6.6–8.7)
PROT SERPL-MCNC: 5.9 G/DL — LOW (ref 6.6–8.7)
PROTHROM AB SERPL-ACNC: 11.4 SEC — SIGNIFICANT CHANGE UP (ref 9.5–13)
PROTHROM AB SERPL-ACNC: 11.4 SEC — SIGNIFICANT CHANGE UP (ref 9.5–13)
RBC # BLD: 4.22 M/UL — SIGNIFICANT CHANGE UP (ref 3.8–5.2)
RBC # BLD: 4.22 M/UL — SIGNIFICANT CHANGE UP (ref 3.8–5.2)
RBC # FLD: 12.7 % — SIGNIFICANT CHANGE UP (ref 10.3–14.5)
RBC # FLD: 12.7 % — SIGNIFICANT CHANGE UP (ref 10.3–14.5)
SODIUM SERPL-SCNC: 135 MMOL/L — SIGNIFICANT CHANGE UP (ref 135–145)
SODIUM SERPL-SCNC: 135 MMOL/L — SIGNIFICANT CHANGE UP (ref 135–145)
TROPONIN T, HIGH SENSITIVITY RESULT: <6 NG/L — SIGNIFICANT CHANGE UP (ref 0–51)
TROPONIN T, HIGH SENSITIVITY RESULT: <6 NG/L — SIGNIFICANT CHANGE UP (ref 0–51)
WBC # BLD: 4.41 K/UL — SIGNIFICANT CHANGE UP (ref 3.8–10.5)
WBC # BLD: 4.41 K/UL — SIGNIFICANT CHANGE UP (ref 3.8–10.5)
WBC # FLD AUTO: 4.41 K/UL — SIGNIFICANT CHANGE UP (ref 3.8–10.5)
WBC # FLD AUTO: 4.41 K/UL — SIGNIFICANT CHANGE UP (ref 3.8–10.5)

## 2023-11-13 PROCEDURE — 84484 ASSAY OF TROPONIN QUANT: CPT

## 2023-11-13 PROCEDURE — 80053 COMPREHEN METABOLIC PANEL: CPT

## 2023-11-13 PROCEDURE — 70450 CT HEAD/BRAIN W/O DYE: CPT | Mod: MA

## 2023-11-13 PROCEDURE — 85730 THROMBOPLASTIN TIME PARTIAL: CPT

## 2023-11-13 PROCEDURE — 0042T: CPT | Mod: MA

## 2023-11-13 PROCEDURE — 99285 EMERGENCY DEPT VISIT HI MDM: CPT

## 2023-11-13 PROCEDURE — 70496 CT ANGIOGRAPHY HEAD: CPT | Mod: 26,MA

## 2023-11-13 PROCEDURE — 82962 GLUCOSE BLOOD TEST: CPT

## 2023-11-13 PROCEDURE — 36415 COLL VENOUS BLD VENIPUNCTURE: CPT

## 2023-11-13 PROCEDURE — 70498 CT ANGIOGRAPHY NECK: CPT | Mod: MA

## 2023-11-13 PROCEDURE — 99284 EMERGENCY DEPT VISIT MOD MDM: CPT | Mod: 25

## 2023-11-13 PROCEDURE — 70496 CT ANGIOGRAPHY HEAD: CPT | Mod: MA

## 2023-11-13 PROCEDURE — 85025 COMPLETE CBC W/AUTO DIFF WBC: CPT

## 2023-11-13 PROCEDURE — 70498 CT ANGIOGRAPHY NECK: CPT | Mod: 26,MA

## 2023-11-13 PROCEDURE — 82550 ASSAY OF CK (CPK): CPT

## 2023-11-13 PROCEDURE — 70450 CT HEAD/BRAIN W/O DYE: CPT | Mod: 26,59,MA

## 2023-11-13 PROCEDURE — 85610 PROTHROMBIN TIME: CPT

## 2023-11-13 NOTE — ED ADULT TRIAGE NOTE - CHIEF COMPLAINT QUOTE
BIB EMS as code stroke.  Pt presents to ED with increasing dizziness, unsteady gait and worsening aphasia x3 hours with LNK 2300.  Pt noted with expressive aphasia in triage.  + REA with 5/5 strength, no numbness tingling, or facial droop.  Hx of seizures

## 2023-11-13 NOTE — ED ADULT NURSE NOTE - NSFALLHARMRISKINTERV_ED_ALL_ED

## 2023-11-13 NOTE — ED PROVIDER NOTE - CARE PROVIDER_API CALL
Roddy Mcbride  Neurology  [address] [place] [address]  Phone: [phone]  Fax: [phone]  Follow Up Time:

## 2023-11-13 NOTE — ED PROVIDER NOTE - CLINICAL SUMMARY MEDICAL DECISION MAKING FREE TEXT BOX
Pt returned to baseline, neuro intact, this episode is most consistent with breakthrough of her seizures and not TIA, stable for dc and has neuro f/u

## 2023-11-13 NOTE — ED ADULT NURSE NOTE - OBJECTIVE STATEMENT
pt is a 58y BIBEMS, pmh absence seizures with aphasia, lkw 2300.  per daughter, she was found in the bathroom reporting dizziness and aphasia.  pt states that these are her symptoms that precede a seizure.  code stroke called, dr luna called for eval.  pt brought to CT scan.

## 2023-11-13 NOTE — ED PROVIDER NOTE - OBJECTIVE STATEMENT
58 year old female with PMH absence seizures presents with aphasia. LKW 2300. The pt was found in the bathroom holding her had in her hands, stating that she was dizzy and having a difficult time getting words out. Family report that this is a usual constellation of Sx prior to her having a seizure and the pt reports that this is exactly how she usually feels prior to a seizure as well. Then with EMS the pt had an episode in which she stopped responded, head tilted back, and then suddenly came to. This is consistent with the description of the pt's absence seizures as per family. As she woke up the pt had persisted difficulty in speaking, but this started to improve upon arrival to the hospital.  No extremity weakness, fevers, chills, vomiting, blurry vision. 58 year old female with PMH absence seizures presents with aphasia. LKW 2300. The pt was found in the bathroom holding her had in her hands, stating that she was dizzy and having a difficult time getting words out. Family report that this is a usual constellation of Sx prior to her having a seizure and the pt reports that this is exactly how she usually feels prior to a seizure as well, and that she also felt the aura of her seizures. Then with EMS the pt had an episode in which she stopped responding, head tilted back, and then suddenly came to. This is consistent with the description of the pt's absence seizures as per family. As she woke up the pt had persisted difficulty in speaking, but this started to improve upon arrival to the hospital.  No extremity weakness, fevers, chills, vomiting, blurry vision.

## 2023-11-13 NOTE — ED ADULT NURSE NOTE - CHIEF COMPLAINT QUOTE
BIB EMS as code stroke.  Pt presents to ED with increasing dizziness, unsteady gait and worsening aphasia x3 hours with LNK 2300.  Pt noted with expressive aphasia in triage.  + ERA with 5/5 strength, no numbness tingling, or facial droop.  Hx of seizures

## 2023-11-13 NOTE — ED PROVIDER NOTE - PROGRESS NOTE DETAILS
Pt re-evaluated, her speech is significantly improved. Given the rapidly improving Sx, non-disabling NIH 1 and the fact that this most likely represents an absence seizure, pt is not a tnk candidate. Pt back to baseline.

## 2023-11-13 NOTE — ED PROVIDER NOTE - PATIENT PORTAL LINK FT
You can access the FollowMyHealth Patient Portal offered by [facility] by registering at the following website: [email or web address]. By joining [employer]’s FollowMyHealth portal, you will also be able to view your health information using other applications (apps) compatible with our system.

## 2023-11-28 ENCOUNTER — OFFICE (OUTPATIENT)
Dept: URBAN - METROPOLITAN AREA CLINIC 100 | Facility: CLINIC | Age: 58
Setting detail: OPHTHALMOLOGY
End: 2023-11-28
Payer: COMMERCIAL

## 2023-11-28 DIAGNOSIS — H02.401: ICD-10-CM

## 2023-11-28 DIAGNOSIS — G70.00: ICD-10-CM

## 2023-11-28 DIAGNOSIS — H16.223: ICD-10-CM

## 2023-11-28 PROBLEM — H01.00 BLEPHARITIS: Status: ACTIVE | Noted: 2023-11-28

## 2023-11-28 PROCEDURE — 99213 OFFICE O/P EST LOW 20 MIN: CPT | Performed by: OPHTHALMOLOGY

## 2023-11-28 ASSESSMENT — REFRACTION_AUTOREFRACTION
OD_SPHERE: -2.25
OS_AXIS: 072
OD_CYLINDER: -2.00
OD_AXIS: 085
OS_CYLINDER: -1.00
OS_SPHERE: -0.75

## 2023-11-28 ASSESSMENT — SUPERFICIAL PUNCTATE KERATITIS (SPK)
OD_SPK: 1+
OS_SPK: 1+

## 2023-11-28 ASSESSMENT — LID EXAM ASSESSMENTS
OD_BLEPHARITIS: 2+
OS_BLEPHARITIS: 2+

## 2023-11-28 ASSESSMENT — CONFRONTATIONAL VISUAL FIELD TEST (CVF)
OD_FINDINGS: FULL
OS_FINDINGS: FULL

## 2023-11-28 ASSESSMENT — SPHEQUIV_DERIVED
OD_SPHEQUIV: -3.25
OS_SPHEQUIV: -1.25

## 2023-11-28 ASSESSMENT — LID POSITION - PTOSIS: OD_PTOSIS: ABSENT

## 2023-12-14 ENCOUNTER — APPOINTMENT (OUTPATIENT)
Dept: OBGYN | Facility: CLINIC | Age: 58
End: 2023-12-14
Payer: COMMERCIAL

## 2023-12-14 VITALS
WEIGHT: 177 LBS | HEIGHT: 67 IN | BODY MASS INDEX: 27.78 KG/M2 | DIASTOLIC BLOOD PRESSURE: 100 MMHG | SYSTOLIC BLOOD PRESSURE: 142 MMHG

## 2023-12-14 DIAGNOSIS — N95.2 POSTMENOPAUSAL ATROPHIC VAGINITIS: ICD-10-CM

## 2023-12-14 DIAGNOSIS — R23.2 FLUSHING: ICD-10-CM

## 2023-12-14 DIAGNOSIS — N90.5 ATROPHY OF VULVA: ICD-10-CM

## 2023-12-14 PROCEDURE — 99396 PREV VISIT EST AGE 40-64: CPT

## 2023-12-14 NOTE — DISCUSSION/SUMMARY
[FreeTextEntry1] : 58-year-old G2, P2 status post VIRGINIE complains of hot flashes.  Physical exam shows atrophic changes.  Pap GC chlamydia sent.  Mammogram and breast sonogram ordered.  Return in 1 year for follow-up.  Patient to use menopausal soy for hot flashes.

## 2023-12-14 NOTE — COUNSELING
[Nutrition/ Exercise/ Weight Management] : nutrition, exercise, weight management [Body Image] : body image [Vitamins/Supplements] : vitamins/supplements [Sunscreen] : sunscreen [Smoking Cessation] : smoking cessation [Drugs/Alcohol] : drugs, alcohol [Breast Self Exam] : breast self exam [Bladder Hygiene] : bladder hygiene [Confidentiality] : confidentiality

## 2023-12-14 NOTE — HISTORY OF PRESENT ILLNESS
[N] : Patient does not use contraception [Y] : Positive pregnancy history [Menarche Age: ____] : age at menarche was [unfilled] [Menopause Age: ____] : age at menopause was [unfilled] [FreeTextEntry1] : 58-year-old G2, P2 status post VIRGINIE presents with complaint of hot flashes.  No pelvic pain or vaginal discharge. [PGHxTotal] : 2 [[facility] [place]FullTerm] : 2 [PGHxPremature] : 0 [PGHxAbortions] : 0 [[facility]xLiving] : 2 [PGHxABInduced] : 0 [PGHxABSpont] : 0 [PGHxEctopic] : 0 [PGHxMultBirths] : 0

## 2023-12-14 NOTE — PHYSICAL EXAM
[Chaperone Present] : A chaperone was present in the examining room during all aspects of the physical examination [FreeTextEntry1] : Nettie [Appropriately responsive] : appropriately responsive [Alert] : alert [No Acute Distress] : no acute distress [No Lymphadenopathy] : no lymphadenopathy [Regular Rate Rhythm] : regular rate rhythm [No Murmurs] : no murmurs [Clear to Auscultation B/L] : clear to auscultation bilaterally [Soft] : soft [Non-tender] : non-tender [Non-distended] : non-distended [No HSM] : No HSM [No Lesions] : no lesions [No Mass] : no mass [Oriented x3] : oriented x3 [FreeTextEntry7] : Right-sided abscess [Examination Of The Breasts] : a normal appearance [No Masses] : no breast masses were palpable [Vulvar Atrophy] : vulvar atrophy [Labia Majora] : normal [Labia Minora] : normal [Normal] : normal [Atrophy] : atrophy [Dry Mucosa] : dry mucosa [Absent] : absent [Uterine Adnexae] : normal

## 2023-12-18 LAB — CYTOLOGY CVX/VAG DOC THIN PREP: NORMAL

## 2023-12-19 NOTE — ED PROVIDER NOTE - NSFOLLOWUPINSTRUCTIONS_ED_ALL_ED_FT
transferred from [facility] for radiation therapy transferred from [facility] for radiation therapy follow up with ent  follow up with mri of brain  meclizine 25mg by mouth every 6 hours

## 2024-02-03 ENCOUNTER — NON-APPOINTMENT (OUTPATIENT)
Age: 59
End: 2024-02-03

## 2024-07-28 ENCOUNTER — OFFICE (OUTPATIENT)
Dept: URBAN - METROPOLITAN AREA CLINIC 12 | Facility: CLINIC | Age: 59
Setting detail: OPHTHALMOLOGY
End: 2024-07-28
Payer: COMMERCIAL

## 2024-07-28 DIAGNOSIS — H35.413: ICD-10-CM

## 2024-07-28 DIAGNOSIS — H52.7: ICD-10-CM

## 2024-07-28 DIAGNOSIS — E05.00: ICD-10-CM

## 2024-07-28 DIAGNOSIS — H43.393: ICD-10-CM

## 2024-07-28 DIAGNOSIS — H16.223: ICD-10-CM

## 2024-07-28 DIAGNOSIS — H25.13: ICD-10-CM

## 2024-07-28 PROCEDURE — 92014 COMPRE OPH EXAM EST PT 1/>: CPT | Performed by: STUDENT IN AN ORGANIZED HEALTH CARE EDUCATION/TRAINING PROGRAM

## 2024-07-28 ASSESSMENT — LID EXAM ASSESSMENTS
OS_BLEPHARITIS: 2+
OD_BLEPHARITIS: 2+

## 2024-07-28 ASSESSMENT — CONFRONTATIONAL VISUAL FIELD TEST (CVF)
OD_FINDINGS: FULL
OS_FINDINGS: FULL

## 2024-07-28 ASSESSMENT — LID POSITION - PTOSIS: OD_PTOSIS: ABSENT

## 2024-08-22 ENCOUNTER — OFFICE (OUTPATIENT)
Dept: URBAN - METROPOLITAN AREA CLINIC 100 | Facility: CLINIC | Age: 59
Setting detail: OPHTHALMOLOGY
End: 2024-08-22
Payer: COMMERCIAL

## 2024-08-22 DIAGNOSIS — H02.402: ICD-10-CM

## 2024-08-22 DIAGNOSIS — H05.20: ICD-10-CM

## 2024-08-22 DIAGNOSIS — H16.223: ICD-10-CM

## 2024-08-22 PROCEDURE — 92012 INTRM OPH EXAM EST PATIENT: CPT | Performed by: OPHTHALMOLOGY

## 2024-08-22 PROCEDURE — 92285 EXTERNAL OCULAR PHOTOGRAPHY: CPT | Performed by: OPHTHALMOLOGY

## 2024-08-22 ASSESSMENT — CONFRONTATIONAL VISUAL FIELD TEST (CVF)
OD_FINDINGS: FULL
OS_FINDINGS: FULL

## 2024-08-22 ASSESSMENT — LID EXAM ASSESSMENTS
OS_LEVATOR_FUNCTION: 18 MM
OD_LEVATOR_FUNCTION: 18 MM
OD_MRD1: 4 MM
OS_BLEPHARITIS: 2+
OD_BLEPHARITIS: 2+
OS_MRD1: 2.5 MM

## 2024-08-22 ASSESSMENT — LID POSITION - PTOSIS
OD_PTOSIS: ABSENT
OS_PTOSIS: LUL

## 2024-12-18 NOTE — ED ADULT TRIAGE NOTE - GLASGOW COMA SCALE: SCORE, MLM
B12 level is normal. You can stop the Vitamin B12  Glucose is okay.  Hemoglobin A1c is a bit better than last time  Thyroid okay  Urine okay  CBC okay  To further evaluate your symptoms I recommend a CT scan of the abdomen and pelvis with contrast
15

## 2024-12-20 ENCOUNTER — APPOINTMENT (OUTPATIENT)
Dept: OBGYN | Facility: CLINIC | Age: 59
End: 2024-12-20

## 2025-01-29 ENCOUNTER — EMERGENCY (EMERGENCY)
Facility: HOSPITAL | Age: 60
LOS: 1 days | Discharge: HOME | End: 2025-01-29
Attending: EMERGENCY MEDICINE
Payer: COMMERCIAL

## 2025-01-29 ENCOUNTER — APPOINTMENT (OUTPATIENT)
Dept: OBGYN | Facility: CLINIC | Age: 60
End: 2025-01-29

## 2025-01-29 VITALS
SYSTOLIC BLOOD PRESSURE: 151 MMHG | RESPIRATION RATE: 16 BRPM | HEIGHT: 62 IN | OXYGEN SATURATION: 98 % | HEART RATE: 60 BPM | TEMPERATURE: 98 F | DIASTOLIC BLOOD PRESSURE: 90 MMHG | WEIGHT: 179.9 LBS

## 2025-01-29 VITALS
SYSTOLIC BLOOD PRESSURE: 109 MMHG | OXYGEN SATURATION: 98 % | TEMPERATURE: 98 F | DIASTOLIC BLOOD PRESSURE: 65 MMHG | RESPIRATION RATE: 18 BRPM | HEART RATE: 57 BPM

## 2025-01-29 LAB
ALBUMIN SERPL ELPH-MCNC: 4.1 G/DL — SIGNIFICANT CHANGE UP (ref 3.3–5.2)
ALP SERPL-CCNC: 65 U/L — SIGNIFICANT CHANGE UP (ref 40–120)
ALT FLD-CCNC: 65 U/L — HIGH
ANION GAP SERPL CALC-SCNC: 9 MMOL/L — SIGNIFICANT CHANGE UP (ref 5–17)
APTT BLD: 28.4 SEC — SIGNIFICANT CHANGE UP (ref 24.5–35.6)
AST SERPL-CCNC: 41 U/L — HIGH
BASOPHILS # BLD AUTO: 0.03 K/UL — SIGNIFICANT CHANGE UP (ref 0–0.2)
BASOPHILS NFR BLD AUTO: 0.4 % — SIGNIFICANT CHANGE UP (ref 0–2)
BILIRUB SERPL-MCNC: 0.7 MG/DL — SIGNIFICANT CHANGE UP (ref 0.4–2)
BLD GP AB SCN SERPL QL: SIGNIFICANT CHANGE UP
BUN SERPL-MCNC: 9.8 MG/DL — SIGNIFICANT CHANGE UP (ref 8–20)
CALCIUM SERPL-MCNC: 9.2 MG/DL — SIGNIFICANT CHANGE UP (ref 8.4–10.5)
CHLORIDE SERPL-SCNC: 101 MMOL/L — SIGNIFICANT CHANGE UP (ref 96–108)
CO2 SERPL-SCNC: 27 MMOL/L — SIGNIFICANT CHANGE UP (ref 22–29)
CREAT SERPL-MCNC: 0.83 MG/DL — SIGNIFICANT CHANGE UP (ref 0.5–1.3)
EGFR: 81 ML/MIN/1.73M2 — SIGNIFICANT CHANGE UP
EOSINOPHIL # BLD AUTO: 0.03 K/UL — SIGNIFICANT CHANGE UP (ref 0–0.5)
EOSINOPHIL NFR BLD AUTO: 0.4 % — SIGNIFICANT CHANGE UP (ref 0–6)
GLUCOSE SERPL-MCNC: 150 MG/DL — HIGH (ref 70–99)
HCT VFR BLD CALC: 37.2 % — SIGNIFICANT CHANGE UP (ref 34.5–45)
HGB BLD-MCNC: 13.3 G/DL — SIGNIFICANT CHANGE UP (ref 11.5–15.5)
IMM GRANULOCYTES NFR BLD AUTO: 0.3 % — SIGNIFICANT CHANGE UP (ref 0–0.9)
INR BLD: 0.96 RATIO — SIGNIFICANT CHANGE UP (ref 0.85–1.16)
LACTATE BLDV-MCNC: 1.2 MMOL/L — SIGNIFICANT CHANGE UP (ref 0.5–2)
LYMPHOCYTES # BLD AUTO: 1.93 K/UL — SIGNIFICANT CHANGE UP (ref 1–3.3)
LYMPHOCYTES # BLD AUTO: 28.1 % — SIGNIFICANT CHANGE UP (ref 13–44)
MCHC RBC-ENTMCNC: 27.9 PG — SIGNIFICANT CHANGE UP (ref 27–34)
MCHC RBC-ENTMCNC: 35.8 G/DL — SIGNIFICANT CHANGE UP (ref 32–36)
MCV RBC AUTO: 78 FL — LOW (ref 80–100)
MONOCYTES # BLD AUTO: 0.37 K/UL — SIGNIFICANT CHANGE UP (ref 0–0.9)
MONOCYTES NFR BLD AUTO: 5.4 % — SIGNIFICANT CHANGE UP (ref 2–14)
NEUTROPHILS # BLD AUTO: 4.48 K/UL — SIGNIFICANT CHANGE UP (ref 1.8–7.4)
NEUTROPHILS NFR BLD AUTO: 65.4 % — SIGNIFICANT CHANGE UP (ref 43–77)
PLATELET # BLD AUTO: 309 K/UL — SIGNIFICANT CHANGE UP (ref 150–400)
POTASSIUM SERPL-MCNC: 3.6 MMOL/L — SIGNIFICANT CHANGE UP (ref 3.5–5.3)
POTASSIUM SERPL-SCNC: 3.6 MMOL/L — SIGNIFICANT CHANGE UP (ref 3.5–5.3)
PROT SERPL-MCNC: 6.7 G/DL — SIGNIFICANT CHANGE UP (ref 6.6–8.7)
PROTHROM AB SERPL-ACNC: 11.1 SEC — SIGNIFICANT CHANGE UP (ref 9.9–13.4)
RAPID RVP RESULT: SIGNIFICANT CHANGE UP
RBC # BLD: 4.77 M/UL — SIGNIFICANT CHANGE UP (ref 3.8–5.2)
RBC # FLD: 12.9 % — SIGNIFICANT CHANGE UP (ref 10.3–14.5)
SARS-COV-2 RNA SPEC QL NAA+PROBE: SIGNIFICANT CHANGE UP
SODIUM SERPL-SCNC: 137 MMOL/L — SIGNIFICANT CHANGE UP (ref 135–145)
TROPONIN T, HIGH SENSITIVITY RESULT: <6 NG/L — SIGNIFICANT CHANGE UP (ref 0–51)
WBC # BLD: 6.86 K/UL — SIGNIFICANT CHANGE UP (ref 3.8–10.5)
WBC # FLD AUTO: 6.86 K/UL — SIGNIFICANT CHANGE UP (ref 3.8–10.5)

## 2025-01-29 PROCEDURE — 99291 CRITICAL CARE FIRST HOUR: CPT | Mod: 25

## 2025-01-29 PROCEDURE — 71045 X-RAY EXAM CHEST 1 VIEW: CPT

## 2025-01-29 PROCEDURE — 0225U NFCT DS DNA&RNA 21 SARSCOV2: CPT

## 2025-01-29 PROCEDURE — 0042T: CPT

## 2025-01-29 PROCEDURE — 70498 CT ANGIOGRAPHY NECK: CPT | Mod: MC

## 2025-01-29 PROCEDURE — 36415 COLL VENOUS BLD VENIPUNCTURE: CPT

## 2025-01-29 PROCEDURE — 82962 GLUCOSE BLOOD TEST: CPT

## 2025-01-29 PROCEDURE — 70551 MRI BRAIN STEM W/O DYE: CPT | Mod: 26

## 2025-01-29 PROCEDURE — 71045 X-RAY EXAM CHEST 1 VIEW: CPT | Mod: 26

## 2025-01-29 PROCEDURE — 85610 PROTHROMBIN TIME: CPT

## 2025-01-29 PROCEDURE — 70450 CT HEAD/BRAIN W/O DYE: CPT | Mod: 26,59

## 2025-01-29 PROCEDURE — 93005 ELECTROCARDIOGRAM TRACING: CPT

## 2025-01-29 PROCEDURE — 70496 CT ANGIOGRAPHY HEAD: CPT | Mod: MC

## 2025-01-29 PROCEDURE — 84484 ASSAY OF TROPONIN QUANT: CPT

## 2025-01-29 PROCEDURE — 70498 CT ANGIOGRAPHY NECK: CPT | Mod: 26

## 2025-01-29 PROCEDURE — 86850 RBC ANTIBODY SCREEN: CPT

## 2025-01-29 PROCEDURE — 85025 COMPLETE CBC W/AUTO DIFF WBC: CPT

## 2025-01-29 PROCEDURE — 85730 THROMBOPLASTIN TIME PARTIAL: CPT

## 2025-01-29 PROCEDURE — 70551 MRI BRAIN STEM W/O DYE: CPT | Mod: MC

## 2025-01-29 PROCEDURE — 80053 COMPREHEN METABOLIC PANEL: CPT

## 2025-01-29 PROCEDURE — 83605 ASSAY OF LACTIC ACID: CPT

## 2025-01-29 PROCEDURE — 0042T: CPT | Mod: MC

## 2025-01-29 PROCEDURE — 93010 ELECTROCARDIOGRAM REPORT: CPT

## 2025-01-29 PROCEDURE — 99291 CRITICAL CARE FIRST HOUR: CPT

## 2025-01-29 PROCEDURE — G0378: CPT

## 2025-01-29 PROCEDURE — 86901 BLOOD TYPING SEROLOGIC RH(D): CPT

## 2025-01-29 PROCEDURE — 86900 BLOOD TYPING SEROLOGIC ABO: CPT

## 2025-01-29 PROCEDURE — 70496 CT ANGIOGRAPHY HEAD: CPT | Mod: 26

## 2025-01-29 PROCEDURE — 70450 CT HEAD/BRAIN W/O DYE: CPT | Mod: MC

## 2025-01-29 RX ORDER — ACETAMINOPHEN 160 MG/5ML
650 SUSPENSION ORAL EVERY 6 HOURS
Refills: 0 | Status: DISCONTINUED | OUTPATIENT
Start: 2025-01-29 | End: 2025-02-05

## 2025-01-29 RX ORDER — ROSUVASTATIN CALCIUM 10 MG/1
10 TABLET, FILM COATED ORAL AT BEDTIME
Refills: 0 | Status: DISCONTINUED | OUTPATIENT
Start: 2025-01-29 | End: 2025-02-05

## 2025-01-29 RX ORDER — GABAPENTIN 800 MG/1
300 TABLET ORAL DAILY
Refills: 0 | Status: DISCONTINUED | OUTPATIENT
Start: 2025-01-29 | End: 2025-02-05

## 2025-01-29 RX ORDER — LAMOTRIGINE 100 MG/1
100 TABLET ORAL
Refills: 0 | Status: DISCONTINUED | OUTPATIENT
Start: 2025-01-29 | End: 2025-02-05

## 2025-01-29 RX ADMIN — LAMOTRIGINE 100 MILLIGRAM(S): 100 TABLET ORAL at 17:23

## 2025-01-29 RX ADMIN — Medication 25 MILLIGRAM(S): at 19:36

## 2025-01-29 RX ADMIN — Medication 50 MILLIGRAM(S): at 13:12

## 2025-01-29 NOTE — ED ADULT NURSE NOTE - NSFALLRISKINTERV_ED_ALL_ED

## 2025-01-29 NOTE — ED CDU PROVIDER DISPOSITION NOTE - NSFOLLOWUPINSTRUCTIONS_ED_ALL_ED_FT
- Follow up with neurology    Dizziness    Dizziness can manifest as a feeling of unsteadiness or light-headedness. You may feel like you are about to faint. This condition can be caused by a number of things, including medicines, dehydration, or illness. Drink enough fluid to keep your urine clear or pale yellow. Do not drink alcohol and limit your caffeine intake. Avoid quick or sudden movements.  Rise slowly from chairs and steady yourself until you feel okay. In the morning, first sit up on the side of the bed.    SEEK IMMEDIATE MEDICAL CARE IF YOU HAVE ANY OF THE FOLLOWING SYMPTOMS: vomiting, changes in your vision or speech, weakness in your arms or legs, trouble speaking or swallowing, chest pain, abdominal pain, shortness of breath, sweating, bleeding, headache, neck pain, or fever.

## 2025-01-29 NOTE — ED ADULT NURSE REASSESSMENT NOTE - NS ED NURSE REASSESS COMMENT FT1
Assumed care of patient at 19:20 from MILES Ospina. Patient A&O x4, denies pain, no complaints of CP/SOB, no acute distress noted. Patient lying comfortably in bed, bed locked and in lowest position, safety maintained. Patient updated on plan of care, awaiting MRI results. Patient remains on CM in sinus bradycardia, HR 56.
Assumed care of pt from cc from MILES Rodriguez. AOx4. Pt resting in stretcher. Pt complaining of mild dizziness. Denies chest pain, SOB, abd pain, back pain, headaches, lightheadedness, fevers, chills, nausea, vomiting, diarrhea, constipation and dysuria. Pt in no apparent distress.  Plan, abnormal labs, history of present illness, pending labs/tests reviewed.
PT being transported down to MRI now.
Report given to MILES MORALEZ. Pt moved to A6L. Patient awake and alert, respirations even and unlabored, in no apparent distress.  Plan, abnormal labs, history of present illness, pending labs/tests explained, opportunity to answer questions provided.
Assumed care of pt from previous RN. A&Ox4, RR even and unlabored on RA. Skin is warm and dry. PT coming to obs unit at this time. PT reports dizziness when changing positions. MAEx4 with ease and equal strength and sensation. NIH of 0 at this time. On CM in NSR. Offering no complaints at this time.

## 2025-01-29 NOTE — ED PROVIDER NOTE - NS ED ROS FT
Constitutional: (-) fever  (-)chills  (-)sweats  Eyes/ENT: (-)   Cardiovascular: (-) chest pain, (-) palpitations (-) edema   Respiratory: (-) cough, (-) shortness of breath   Gastrointestinal: (-)nausea  (-)vomiting, (-) diarrhea  (-) abdominal pain   :  (-)dysuria, (-)frequency, (-)urgency, (-)hematuria  Musculoskeletal: (-) neck pain, (-) back pain, (-) joint pain  Integumentary: (-) rash, (-) edema  Neurological: (-) headache, (-) altered mental status  (-)LOC +unsteady gait

## 2025-01-29 NOTE — ED ADULT NURSE NOTE - SUICIDE SCREENING QUESTION 1
Patient unable to complete
DVT PPx: HSQ  Code status: DNR but not DNI    Claudio Fam  PGY2  [phone]

## 2025-01-29 NOTE — ED CDU PROVIDER INITIAL DAY NOTE - ATTENDING APP SHARED VISIT CONTRIBUTION OF CARE
67-year-old female history of "seizures" on Lamictal, neuropathy, Ménière's disease, had presented to the emergency department with reported dizziness described as rocking on a boat worse with head movement, associated with unsteady gait and occipital headache.  Reports history of prior headaches.  Reports right-sided ear ringing consistent with her Ménière's.  Onset of symptoms reported as sometime yesterday, worse around 2 AM this morning.  Denies weakness to upper or lower extremities numbness or tingling changes in speech.  Denies recent sick contacts illness or travel.  No associated chest pain shortness of breath.  Patient was code stroke on arrival to the emergency department CT angio head neck and perfusion negative for acute ischemic event mass or bleed.  Placed into observation for MRI of the head.   Patient currently without focal deficits.  Reports improvement of dizziness with meclizine.  Was able to ambulate with son at bedside.  Has outpatient scheduled appointment with ENT on Wednesday of next week, and is being followed by her outpatient neurologist Dr. Shultz.  Last MRI reported as sometime last year as well as extensive cardiac workup sometime last year.    According to aide at bedside pt is back to her baseline, pt without acute complaints. will monitor, obtain mri brain, re-eval, continue home meds 67-year-old female history of "seizures" on Lamictal, neuropathy, Ménière's disease, had presented to the emergency department with reported dizziness described as rocking on a boat worse with head movement, associated with unsteady gait and occipital headache.  Reports history of prior headaches.  Reports right-sided ear ringing consistent with her Ménière's.  Onset of symptoms reported as sometime yesterday, worse around 2 AM this morning.  Denies weakness to upper or lower extremities numbness or tingling changes in speech.  Denies recent sick contacts illness or travel.  No associated chest pain shortness of breath.  Patient was code stroke on arrival to the emergency department CT angio head neck and perfusion negative for acute ischemic event mass or bleed.  Placed into observation for MRI of the head.   Patient currently without focal deficits.  Reports improvement of dizziness with meclizine.  Was able to ambulate with son at bedside.  Has outpatient scheduled appointment with ENT on Wednesday of next week, and is being followed by her outpatient neurologist Dr. Shultz.  Last MRI reported as sometime last year as well as extensive cardiac workup sometime last year.     pt is back to her baseline, pt without acute complaints. will monitor, obtain mri brain, re-eval, continue home meds

## 2025-01-29 NOTE — ED ADULT NURSE NOTE - OBJECTIVE STATEMENT
Pt initially presented to ED as a code stroke. PMHx seizures on Lamictal, pt states that she did not miss any doses today. Currently AAOx3 at this time.

## 2025-01-29 NOTE — ED ADULT NURSE NOTE - HIV OFFER
Quality 110: Preventive Care And Screening: Influenza Immunization: Influenza immunization was not ordered or administered, reason not given Detail Level: Detailed Unable to answer due to medical condition/unresponsive/etc...

## 2025-01-29 NOTE — ED PROVIDER NOTE - OBJECTIVE STATEMENT
59-year-old female; with PMH significant for seizure disorder (on Lamictal twice daily), neuropathy with chronic right-sided weakness; now presenting with dizziness and unsteady gait since awakening this morning.  LKW 2 AM this morning.  Patient awoke to this morning with unsteadiness.  Complaining of headache–posterior, occipital headache.  Denies nausea or vomiting.  Denies chest pain or shortness of breath.  Denies any new focal weakness.  Denies abdominal pain.  Denies back pain.  Denies fever, chills, sweats.

## 2025-01-29 NOTE — ED ADULT NURSE NOTE - NSFALLRISKASMTTYPE_ED_ALL_ED
Due to early onset of symptoms and flu exposure from sibling we will treat clinically for the flu; you may return as a nurse visit in the next 1-2 days to be reswabbed if desired.   Medications sent to the pharmacy   Start the tamiflu today   Encourage fluids  Monitor for fever  Tylenol or Motrin as needed rotated every 3 hours as needed  Quarantine for 5-7 days, once fever free for 24 hours he/she may return to school   Complications to the flu include ear infections sinus infections bronchitis and pneumonia.   If child develops shortness of breath worsening of the cough, is still having fever after 5 days, or decreased urine output to less than 3/day, seek medical attention immediately    Otitis Media   Medications sent to pharmacy  Take all of the antibiotic even if symptoms improve; give with food and start a probiotic or give yogurt  Monitor for fever  Tylenol or ibuprofen as needed for fever or pain  Children's Claritin as needed for runny nose or congestion  If symptoms persist or worsen return to clinic or seek medical attention immediately  
Initial (On Arrival)

## 2025-01-29 NOTE — ED CDU PROVIDER DISPOSITION NOTE - ATTENDING CONTRIBUTION TO CARE
I agree with the PA's note and was available for any issues/concerns. I was directly involved in patient care. My brief overall assessment is as follows: hx meineires with dizziness, mri neg, symptoms improved. neuro intact. stable for d/c.

## 2025-01-29 NOTE — ED PROVIDER NOTE - PHYSICAL EXAMINATION
Gen: Alert, NAD  Head: NC, AT, PERRL, EOMI, normal lids/conjunctiva  ENT: B TM WNL, normal hearing, patent oropharynx without erythema/exudate, uvula midline  Neck: +supple, no tenderness/meningismus/JVD, +Trachea midline  Pulm: Bilateral BS, normal resp effort, no wheeze/stridor/retractions  CV: RRR, no M/R/G, 2+dist pulses  Abd: soft, NT/ND, +BS, no hepatosplenomegaly  Mskel: ROM intact x4 extremities.  no edema/erythema/cyanosis  Neuro: AAOx3, no sensory/motor deficits, CN 2-12 intact, NIHSS=0

## 2025-01-29 NOTE — ED CDU PROVIDER INITIAL DAY NOTE - CLINICAL SUMMARY MEDICAL DECISION MAKING FREE TEXT BOX
67-year-old female history of "seizures" on Lamictal, neuropathy, Ménière's disease, had presented to the emergency department with reported dizziness described as rocking on a boat worse with head movement, associated with unsteady gait and occipital headache.  Reports history of prior headaches.  Reports right-sided ear ringing consistent with her Ménière's.  Onset of symptoms reported as sometime yesterday, worse around 2 AM this morning.  Denies weakness to upper or lower extremities numbness or tingling changes in speech.  Denies recent sick contacts illness or travel.  No associated chest pain shortness of breath.  Patient was code stroke on arrival to the emergency department CT angio head neck and perfusion negative for acute ischemic event mass or bleed.  Placed into observation for MRI of the head.   Patient currently without focal deficits.  Reports improvement of dizziness with meclizine.  Was able to ambulate with son at bedside.  Has outpatient scheduled appointment with ENT on Wednesday of next week, and is being followed by her outpatient neurologist Dr. Shultz.  Last MRI reported as sometime last year as well as extensive cardiac workup sometime last year.    pt non focal   continue meclizine, pending MR head non con

## 2025-01-29 NOTE — ED PROVIDER NOTE - CLINICAL SUMMARY MEDICAL DECISION MAKING FREE TEXT BOX
59-year-old female; with PMH significant for seizure disorder (on Lamictal twice daily), neuropathy with chronic right-sided weakness; now presenting with dizziness and unsteady gait since awakening this morning.  LKW 2 AM this morning.  Patient awoke to this morning with unsteadiness.  Complaining of headache–posterior, occipital headache.  Denies nausea or vomiting.  Denies chest pain or shortness of breath.  Denies any new focal weakness.    Code stroke called upon arrival, NIHSS 0.  Patient with persistent dizziness after meclizine.  Patient to be placed in ops for MRI.  Neurology consulted for further evaluation. 59-year-old female; with PMH significant for seizure disorder (on Lamictal twice daily), neuropathy with chronic right-sided weakness; now presenting with dizziness and unsteady gait since awakening this morning.  LKW 2 AM this morning.  Patient awoke to this morning with unsteadiness.  Complaining of headache–posterior, occipital headache.  Denies nausea or vomiting.  Denies chest pain or shortness of breath.  Denies any new focal weakness.    Code stroke called upon arrival, NIHSS 0.  Patient with persistent dizziness after meclizine.  Patient to be placed in obs for MRI.  Neurology consulted for further evaluation.

## 2025-01-29 NOTE — ED CDU PROVIDER DISPOSITION NOTE - PATIENT PORTAL LINK FT
You can access the FollowMyHealth Patient Portal offered by [facility] by registering at the following website: [email or web address]. By joining [facility]’s FollowMyHealth portal, you will also be able to view your health information using other applications (apps) compatible with our system.

## 2025-01-29 NOTE — ED CDU PROVIDER INITIAL DAY NOTE - OBJECTIVE STATEMENT
67-year-old female history of "seizures" on Lamictal, neuropathy, Ménière's disease, had presented to the emergency department with reported dizziness described as rocking on a boat worse with head movement, associated with unsteady gait and occipital headache.  Reports history of prior headaches.  Reports right-sided ear ringing consistent with her Ménière's.  Onset of symptoms reported as sometime yesterday, worse around 2 AM this morning.  Denies weakness to upper or lower extremities numbness or tingling changes in speech.  Denies recent sick contacts illness or travel.  No associated chest pain shortness of breath.  Patient was code stroke on arrival to the emergency department CT angio head neck and perfusion negative for acute ischemic event mass or bleed.  Placed into observation for MRI of the head.   Patient currently without focal deficits.  Reports improvement of dizziness with meclizine.  Was able to ambulate with son at bedside.  Has outpatient scheduled appointment with ENT on Wednesday of next week, and is being followed by her outpatient neurologist Dr. Shultz.  Last MRI reported as sometime last year as well as extensive cardiac workup sometime last year.

## 2025-01-29 NOTE — ED PROVIDER NOTE - CRITICAL CARE ATTENDING CONTRIBUTION TO CARE
Upon my evaluation, this patient had a high probability of imminent or life-threatening deterioration due to __CODE STROKE__ , which required my direct attention, intervention, and personal management.    I have personally provided _40_ minutes of critical care time exclusive of time spent on separately billable procedures.  Time includes review of laboratory data, radiology results, discussion with consultants, and monitoring for potential decompensation.  Interventions were performed as documented.

## 2025-01-29 NOTE — ED CDU PROVIDER DISPOSITION NOTE - CLINICAL COURSE
67-year-old female history of "seizures" on Lamictal, neuropathy, Ménière's disease, had presented to the emergency department with reported dizziness described as rocking on a boat worse with head movement, associated with unsteady gait and occipital headache.  Reports history of prior headaches.  Reports right-sided ear ringing consistent with her Ménière's.  Onset of symptoms reported as sometime yesterday, worse around 2 AM this morning.  Denies weakness to upper or lower extremities numbness or tingling changes in speech.  Denies recent sick contacts illness or travel.  No associated chest pain shortness of breath.  Patient was code stroke on arrival to the emergency department CT angio head neck and perfusion negative for acute ischemic event mass or bleed.  Patient currently without focal deficits.  Reports improvement of dizziness with meclizine.  Was able to ambulate on own.  CTA head/neck negative, MR head negative  Pt reports ENT on Wednesday of next week, and followed by neurologist Dr. Shultz.  Pt reassessed, pt feeling better at this time, vss, pt able to walk, talk and vocalized plan of action. Discussed in depth and explained to pt in depth the next steps that need to be taken including proper follow up with PCP or specialists. All incidental findings were discussed with pt as well. Pt verbalized their concerns and all questions were answered. Pt understands dispo and wants discharge. Given good instructions when to return to ED, strict return precautions and importance of f/u.

## 2025-01-29 NOTE — ED ADULT TRIAGE NOTE - CHIEF COMPLAINT QUOTE
A&O x 4 hx of vertigo BIBA for headache, dizziness, and unsteady gait. LKW 0200 this AM. Dr. Ware at bedside. Code stroke called. To CT.

## 2025-03-21 ENCOUNTER — NON-APPOINTMENT (OUTPATIENT)
Age: 60
End: 2025-03-21

## 2025-03-21 ENCOUNTER — APPOINTMENT (OUTPATIENT)
Dept: OBGYN | Facility: CLINIC | Age: 60
End: 2025-03-21

## 2025-03-21 VITALS
HEIGHT: 67 IN | DIASTOLIC BLOOD PRESSURE: 82 MMHG | BODY MASS INDEX: 26.68 KG/M2 | SYSTOLIC BLOOD PRESSURE: 140 MMHG | WEIGHT: 170 LBS

## 2025-03-21 DIAGNOSIS — E66.3 OVERWEIGHT: ICD-10-CM

## 2025-03-21 DIAGNOSIS — N95.2 POSTMENOPAUSAL ATROPHIC VAGINITIS: ICD-10-CM

## 2025-03-21 DIAGNOSIS — Z01.419 ENCOUNTER FOR GYNECOLOGICAL EXAMINATION (GENERAL) (ROUTINE) W/OUT ABNORMAL FINDINGS: ICD-10-CM

## 2025-03-21 DIAGNOSIS — R23.2 FLUSHING: ICD-10-CM

## 2025-03-21 DIAGNOSIS — N90.5 ATROPHY OF VULVA: ICD-10-CM

## 2025-03-21 PROCEDURE — 99459 PELVIC EXAMINATION: CPT

## 2025-03-21 PROCEDURE — 99396 PREV VISIT EST AGE 40-64: CPT

## 2025-03-21 PROCEDURE — 99214 OFFICE O/P EST MOD 30 MIN: CPT | Mod: 25

## 2025-03-27 LAB — CYTOLOGY CVX/VAG DOC THIN PREP: NORMAL

## 2025-04-12 ENCOUNTER — NON-APPOINTMENT (OUTPATIENT)
Age: 60
End: 2025-04-12

## 2025-07-28 ENCOUNTER — OFFICE (OUTPATIENT)
Dept: URBAN - METROPOLITAN AREA CLINIC 12 | Facility: CLINIC | Age: 60
Setting detail: OPHTHALMOLOGY
End: 2025-07-28
Payer: COMMERCIAL

## 2025-07-28 DIAGNOSIS — H52.4: ICD-10-CM

## 2025-07-28 DIAGNOSIS — H35.413: ICD-10-CM

## 2025-07-28 DIAGNOSIS — H25.13: ICD-10-CM

## 2025-07-28 DIAGNOSIS — H43.393: ICD-10-CM

## 2025-07-28 DIAGNOSIS — H02.402: ICD-10-CM

## 2025-07-28 DIAGNOSIS — H16.223: ICD-10-CM

## 2025-07-28 DIAGNOSIS — H05.20: ICD-10-CM

## 2025-07-28 PROCEDURE — 92015 DETERMINE REFRACTIVE STATE: CPT | Performed by: STUDENT IN AN ORGANIZED HEALTH CARE EDUCATION/TRAINING PROGRAM

## 2025-07-28 PROCEDURE — 92014 COMPRE OPH EXAM EST PT 1/>: CPT | Performed by: STUDENT IN AN ORGANIZED HEALTH CARE EDUCATION/TRAINING PROGRAM

## 2025-07-28 PROCEDURE — 92250 FUNDUS PHOTOGRAPHY W/I&R: CPT | Performed by: STUDENT IN AN ORGANIZED HEALTH CARE EDUCATION/TRAINING PROGRAM

## 2025-07-28 ASSESSMENT — REFRACTION_MANIFEST
OD_CYLINDER: -2.00
OS_AXIS: 080
OS_ADD: +2.25
OD_SPHERE: -2.50
OS_VA1: 20/20
OS_SPHERE: -0.75
OD_VA1: 20/20
OD_ADD: +2.25
OS_CYLINDER: -1.00
OD_AXIS: 090

## 2025-07-28 ASSESSMENT — LID POSITION - PTOSIS
OD_PTOSIS: ABSENT
OS_PTOSIS: LUL

## 2025-07-28 ASSESSMENT — LID EXAM ASSESSMENTS
OD_MRD1: 4 MM
OD_LEVATOR_FUNCTION: 18 MM
OS_LEVATOR_FUNCTION: 18 MM
OD_BLEPHARITIS: 2+
OS_BLEPHARITIS: 2+
OS_MRD1: 2.5 MM

## 2025-07-28 ASSESSMENT — REFRACTION_AUTOREFRACTION
OD_CYLINDER: -2.00
OS_AXIS: 080
OS_CYLINDER: -1.00
OD_AXIS: 090
OS_SPHERE: -0.50
OD_SPHERE: -2.00

## 2025-07-28 ASSESSMENT — TONOMETRY
OS_IOP_MMHG: 13
OD_IOP_MMHG: 13

## 2025-07-28 ASSESSMENT — KERATOMETRY
OS_AXISANGLE_DEGREES: 133
OS_K1POWER_DIOPTERS: 42.25
OD_K2POWER_DIOPTERS: 43.75
METHOD_AUTO_MANUAL: AUTO
OD_K1POWER_DIOPTERS: 43.00
OD_AXISANGLE_DEGREES: 026
OS_K2POWER_DIOPTERS: 43.25

## 2025-07-28 ASSESSMENT — SUPERFICIAL PUNCTATE KERATITIS (SPK)
OS_SPK: 1+
OD_SPK: 1+

## 2025-07-28 ASSESSMENT — CONFRONTATIONAL VISUAL FIELD TEST (CVF)
OS_FINDINGS: FULL
OD_FINDINGS: FULL

## 2025-07-28 ASSESSMENT — VISUAL ACUITY
OS_BCVA: 20/20-1
OD_BCVA: 20/25+2